# Patient Record
Sex: FEMALE | Race: WHITE | Employment: FULL TIME | URBAN - METROPOLITAN AREA
[De-identification: names, ages, dates, MRNs, and addresses within clinical notes are randomized per-mention and may not be internally consistent; named-entity substitution may affect disease eponyms.]

---

## 2017-12-04 PROCEDURE — 87591 N.GONORRHOEAE DNA AMP PROB: CPT | Performed by: NURSE PRACTITIONER

## 2017-12-04 PROCEDURE — 88175 CYTOPATH C/V AUTO FLUID REDO: CPT | Performed by: NURSE PRACTITIONER

## 2017-12-04 PROCEDURE — 87491 CHLMYD TRACH DNA AMP PROBE: CPT | Performed by: NURSE PRACTITIONER

## 2018-12-11 PROCEDURE — 88175 CYTOPATH C/V AUTO FLUID REDO: CPT | Performed by: NURSE PRACTITIONER

## 2024-09-10 ENCOUNTER — NURSE ONLY (OUTPATIENT)
Dept: INTERNAL MEDICINE CLINIC | Facility: HOSPITAL | Age: 29
End: 2024-09-10
Attending: PREVENTIVE MEDICINE

## 2024-09-10 DIAGNOSIS — Z00.00 WELLNESS EXAMINATION: Primary | ICD-10-CM

## 2024-09-10 PROCEDURE — 86480 TB TEST CELL IMMUN MEASURE: CPT

## 2024-09-12 LAB
M TB IFN-G CD4+ T-CELLS BLD-ACNC: 0 IU/ML
M TB TUBERC IFN-G BLD QL: NEGATIVE
M TB TUBERC IGNF/MITOGEN IGNF CONTROL: >10 IU/ML
QFT TB1 AG MINUS NIL: 0 IU/ML
QFT TB2 AG MINUS NIL: 0 IU/ML

## 2025-03-17 ENCOUNTER — OFFICE VISIT (OUTPATIENT)
Dept: FAMILY MEDICINE CLINIC | Facility: CLINIC | Age: 30
End: 2025-03-17

## 2025-03-17 VITALS
DIASTOLIC BLOOD PRESSURE: 72 MMHG | SYSTOLIC BLOOD PRESSURE: 110 MMHG | HEIGHT: 62 IN | BODY MASS INDEX: 21.9 KG/M2 | HEART RATE: 72 BPM | WEIGHT: 119 LBS

## 2025-03-17 DIAGNOSIS — E55.9 VITAMIN D DEFICIENCY: ICD-10-CM

## 2025-03-17 DIAGNOSIS — Z00.00 ENCOUNTER FOR ANNUAL HEALTH EXAMINATION: Primary | ICD-10-CM

## 2025-03-17 NOTE — PROGRESS NOTES
HPI:   Amarilys Herrera is a 29 year old female who presents for a complete physical exam.    Lives with sister and her BILL and nephew.   Currently going through a divorce.     EXERCISING reg  Meal prepping.       Menses are regular        Wt Readings from Last 3 Encounters:   25 119 lb (54 kg)   21 125 lb (56.7 kg)   20 122 lb (55.3 kg)     Body mass index is 21.77 kg/m².       Current Outpatient Medications   Medication Sig Dispense Refill    Norethin Ace-Eth Estrad-FE (JUNEL FE 1/20) 1-20 MG-MCG Oral Tab Take 1 tablet by mouth daily. (Patient not taking: Reported on 3/17/2025) 84 tablet 3    Nitrofurantoin Monohyd Macro 100 MG Oral Cap TAKE 1 CAPSULE BY MOUTH AS NEEDED AFTER INTERCOURSE AS DIRECTED (Patient not taking: Reported on 3/17/2025) 90 capsule 1    valACYclovir HCl 500 MG Oral Tab TK 1 T PO BID FOR 5 DAYS (Patient not taking: Reported on 3/17/2025) 10 tablet 3      Past Medical History:    Herpes simplex    oral    OTHER DISEASES    eczema    Recurrent UTI    VARICELLA    shingles      Past Surgical History:   Procedure Laterality Date    [place] teeth removed  2017      Family History   Problem Relation Age of Onset    Hypertension Father     Other (Dyslipidemia) Father     Hypertension Mother     Cancer Mother         thyroid cancer    Asthma Mother     Other (Myocardial infaction) Maternal Grandfather 46        massive heart attack       Social History:   Social History     Socioeconomic History    Marital status:    Tobacco Use    Smoking status: Never     Passive exposure: Never    Smokeless tobacco: Never   Vaping Use    Vaping status: Never Used   Substance and Sexual Activity    Alcohol use: Yes     Comment: 5 drinks weekly     Drug use: Not Currently     Types: Cannabis    Sexual activity: Yes     Partners: Male     Birth control/protection: Condom     Comment: 2014     Social Drivers of Health     Food Insecurity: No Food Insecurity (2022)    Received from  [facility]    Hunger Vital Sign     Worried About Running Out of Food in the Last Year: Never true     Ran Out of Food in the Last Year: Never true   Transportation Needs: No Transportation Needs (2/7/2022)    Received from [facility]    PRAPARE - Transportation     Lack of Transportation (Medical): No     Lack of Transportation (Non-Medical): No   Stress: No Stress Concern Present (2/7/2022)    Received from [facility]    [employer] - Occupational Stress Questionnaire     Feeling of Stress : Not at all   Housing Stability: Low Risk  (2/7/2022)    Received from [facility]    Housing Stability Vital Sign     Unable to Pay for Housing in the Last Year: No     Number of Places Lived in the Last Year: 1     Unstable Housing in the Last Year: No          REVIEW OF SYSTEMS:   Negative, except per HPI.     EXAM:   /72 (BP Location: Left arm, Patient Position: Sitting, Cuff Size: adult)   Pulse 72   Ht 5' 2\" (1.575 m)   Wt 119 lb (54 kg)   LMP 02/19/2025 (Exact Date)   BMI 21.77 kg/m²     GENERAL: well developed, well nourished, in no apparent distress  SKIN: no rashes, no suspicious lesions  HEENT: atraumatic, normocephalic, ears are clear  EYES: PERRLA, EOMI,conjunctiva are clear  NECK: supple, no adenopathy  LUNGS: clear to auscultation  CARDIO: RRR without murmur  GI: good BS, no masses or tenderness  MUSCULOSKELETAL: back is not tender, FROM of the back  EXTREMITIES: no cyanosis or edema  NEURO: Oriented times three, cranial nerves are intact, motor and sensory are grossly intact    ASSESSMENT AND PLAN:   Amarilys Herrera is a 29 year old female who presents for a complete physical exam.    1. Encounter for annual health examination  -Medical, surgical and social history, as well as medications and allergies were reviewed with patient.  Has appt with obg tomorrow  - CBC With Differential With  Platelet; Future  - Comp Metabolic Panel (14); Future  - Hemoglobin A1C; Future  - Lipid Panel; Future  - TSH W Reflex To Free T4; Future    2. Vitamin D deficiency    - Vitamin D [E]; Future         Shawanda Hinton MD  3/17/2025  11:27 AM

## 2025-03-18 ENCOUNTER — OFFICE VISIT (OUTPATIENT)
Dept: OBGYN CLINIC | Facility: CLINIC | Age: 30
End: 2025-03-18
Payer: COMMERCIAL

## 2025-03-18 ENCOUNTER — LAB ENCOUNTER (OUTPATIENT)
Dept: LAB | Age: 30
End: 2025-03-18
Attending: STUDENT IN AN ORGANIZED HEALTH CARE EDUCATION/TRAINING PROGRAM
Payer: COMMERCIAL

## 2025-03-18 VITALS — WEIGHT: 117 LBS | SYSTOLIC BLOOD PRESSURE: 123 MMHG | DIASTOLIC BLOOD PRESSURE: 79 MMHG | BODY MASS INDEX: 21 KG/M2

## 2025-03-18 DIAGNOSIS — N89.8 VAGINAL DISCHARGE: ICD-10-CM

## 2025-03-18 DIAGNOSIS — Z11.3 SCREENING EXAMINATION FOR STD (SEXUALLY TRANSMITTED DISEASE): ICD-10-CM

## 2025-03-18 DIAGNOSIS — Z00.00 ENCOUNTER FOR ANNUAL HEALTH EXAMINATION: ICD-10-CM

## 2025-03-18 DIAGNOSIS — Z12.4 SCREENING FOR CERVICAL CANCER: Primary | ICD-10-CM

## 2025-03-18 DIAGNOSIS — E55.9 VITAMIN D DEFICIENCY: ICD-10-CM

## 2025-03-18 LAB
ALBUMIN SERPL-MCNC: 4.5 G/DL (ref 3.2–4.8)
ALBUMIN/GLOB SERPL: 1.5 {RATIO} (ref 1–2)
ALP LIVER SERPL-CCNC: 31 U/L
ALT SERPL-CCNC: 18 U/L
ANION GAP SERPL CALC-SCNC: 8 MMOL/L (ref 0–18)
AST SERPL-CCNC: 18 U/L (ref ?–34)
BASOPHILS # BLD AUTO: 0.05 X10(3) UL (ref 0–0.2)
BASOPHILS NFR BLD AUTO: 0.8 %
BILIRUB SERPL-MCNC: 0.8 MG/DL (ref 0.3–1.2)
BUN BLD-MCNC: 13 MG/DL (ref 9–23)
BUN/CREAT SERPL: 13.4 (ref 10–20)
CALCIUM BLD-MCNC: 9.4 MG/DL (ref 8.7–10.4)
CHLORIDE SERPL-SCNC: 106 MMOL/L (ref 98–112)
CHOLEST SERPL-MCNC: 166 MG/DL (ref ?–200)
CO2 SERPL-SCNC: 27 MMOL/L (ref 21–32)
CREAT BLD-MCNC: 0.97 MG/DL
DEPRECATED RDW RBC AUTO: 43.2 FL (ref 35.1–46.3)
EGFRCR SERPLBLD CKD-EPI 2021: 80 ML/MIN/1.73M2 (ref 60–?)
EOSINOPHIL # BLD AUTO: 0.17 X10(3) UL (ref 0–0.7)
EOSINOPHIL NFR BLD AUTO: 2.9 %
ERYTHROCYTE [DISTWIDTH] IN BLOOD BY AUTOMATED COUNT: 12.9 % (ref 11–15)
EST. AVERAGE GLUCOSE BLD GHB EST-MCNC: 100 MG/DL (ref 68–126)
FASTING PATIENT LIPID ANSWER: YES
FASTING STATUS PATIENT QL REPORTED: YES
GLOBULIN PLAS-MCNC: 3 G/DL (ref 2–3.5)
GLUCOSE BLD-MCNC: 84 MG/DL (ref 70–99)
HBA1C MFR BLD: 5.1 % (ref ?–5.7)
HBV SURFACE AG SER-ACNC: <0.1 [IU]/L
HBV SURFACE AG SERPL QL IA: NONREACTIVE
HCT VFR BLD AUTO: 40.5 %
HCV AB SERPL QL IA: NONREACTIVE
HDLC SERPL-MCNC: 58 MG/DL (ref 40–59)
HGB BLD-MCNC: 13.2 G/DL
IMM GRANULOCYTES # BLD AUTO: 0.02 X10(3) UL (ref 0–1)
IMM GRANULOCYTES NFR BLD: 0.3 %
LDLC SERPL CALC-MCNC: 96 MG/DL (ref ?–100)
LYMPHOCYTES # BLD AUTO: 1.94 X10(3) UL (ref 1–4)
LYMPHOCYTES NFR BLD AUTO: 32.8 %
MCH RBC QN AUTO: 29.6 PG (ref 26–34)
MCHC RBC AUTO-ENTMCNC: 32.6 G/DL (ref 31–37)
MCV RBC AUTO: 90.8 FL
MONOCYTES # BLD AUTO: 0.44 X10(3) UL (ref 0.1–1)
MONOCYTES NFR BLD AUTO: 7.4 %
NEUTROPHILS # BLD AUTO: 3.3 X10 (3) UL (ref 1.5–7.7)
NEUTROPHILS # BLD AUTO: 3.3 X10(3) UL (ref 1.5–7.7)
NEUTROPHILS NFR BLD AUTO: 55.8 %
NONHDLC SERPL-MCNC: 108 MG/DL (ref ?–130)
OSMOLALITY SERPL CALC.SUM OF ELEC: 291 MOSM/KG (ref 275–295)
PLATELET # BLD AUTO: 231 10(3)UL (ref 150–450)
POTASSIUM SERPL-SCNC: 4.2 MMOL/L (ref 3.5–5.1)
PROT SERPL-MCNC: 7.5 G/DL (ref 5.7–8.2)
RBC # BLD AUTO: 4.46 X10(6)UL
SODIUM SERPL-SCNC: 141 MMOL/L (ref 136–145)
T PALLIDUM AB SER QL IA: NONREACTIVE
TRIGL SERPL-MCNC: 63 MG/DL (ref 30–149)
TSI SER-ACNC: 0.69 UIU/ML (ref 0.55–4.78)
VIT D+METAB SERPL-MCNC: 24 NG/ML (ref 30–100)
VLDLC SERPL CALC-MCNC: 10 MG/DL (ref 0–30)
WBC # BLD AUTO: 5.9 X10(3) UL (ref 4–11)

## 2025-03-18 PROCEDURE — 80053 COMPREHEN METABOLIC PANEL: CPT

## 2025-03-18 PROCEDURE — 86803 HEPATITIS C AB TEST: CPT

## 2025-03-18 PROCEDURE — 87389 HIV-1 AG W/HIV-1&-2 AB AG IA: CPT

## 2025-03-18 PROCEDURE — 83036 HEMOGLOBIN GLYCOSYLATED A1C: CPT

## 2025-03-18 PROCEDURE — 86780 TREPONEMA PALLIDUM: CPT

## 2025-03-18 PROCEDURE — 36415 COLL VENOUS BLD VENIPUNCTURE: CPT

## 2025-03-18 PROCEDURE — 99385 PREV VISIT NEW AGE 18-39: CPT | Performed by: STUDENT IN AN ORGANIZED HEALTH CARE EDUCATION/TRAINING PROGRAM

## 2025-03-18 PROCEDURE — 87340 HEPATITIS B SURFACE AG IA: CPT

## 2025-03-18 PROCEDURE — 85025 COMPLETE CBC W/AUTO DIFF WBC: CPT

## 2025-03-18 PROCEDURE — 84443 ASSAY THYROID STIM HORMONE: CPT

## 2025-03-18 PROCEDURE — 82306 VITAMIN D 25 HYDROXY: CPT

## 2025-03-18 PROCEDURE — 80061 LIPID PANEL: CPT

## 2025-03-18 NOTE — PROGRESS NOTES
[facility]  Obstetrics and Gynecology  Annual  bAy Harris PA-C    Chief Complaint   Patient presents with    New Patient    Gynecologic Exam     Pap. Reviewed Preventative/Wellness form with patient.        Amarilys Herrera is a 29 year old female  is a new patient to me presenting for her annual well woman exam. Patient's last menstrual period was 2025 (exact date). Reports regular cycles.  Not currently sexually active, would like STD screening. No history of abnormal pap smears. Has noted more green tinged discharge for the last several days. No odor, irritation, or itching.     Pap:   Contraception:None  Gardasil: completed    OBSTETRICS HISTORY:     OB History    Para Term  AB Living   0 0 0 0 0 0   SAB IAB Ectopic Multiple Live Births   0 0 0 0         GYNE HISTORY:     Menarche: [age]y/o (3/18/2025  7:28 AM)  Period Cycle (Days): 21-28days (3/18/2025  7:28 AM)  Period Duration (Days): 5-7days (3/18/2025  7:28 AM)  Period Flow: Heavy to light (3/18/2025  7:28 AM)  Use of Birth Control (if yes, specify type): None (3/18/2025  7:28 AM)  Hx Prior Abnormal Pap: No (3/18/2025  7:28 AM)      History   Sexual Activity    Sexual activity: Yes    Partners: Male    Birth control/ protection: Condom     Comment: 2014           Latest Ref Rng & Units 2018     7:41 AM 2017    10:06 AM 2016     4:46 PM   RECENT PAP RESULTS   INTERPRETATION/RESULT: Negative for intraepithelial lesion or malignancy, Negative for intraepithelial lesion or malignancy-Reactive/Other changes, Unsatisfactory for Evaluation Negative for intraepithelial lesion or malignancy  Negative for intraepithelial lesion or malignancy  Negative for intraepithelial lesion or malignancy          MEDICAL HISTORY:     Past Medical History:   Diagnosis Date    Herpes simplex     oral    OTHER DISEASES     eczema    Recurrent UTI 2015    VARICELLA 2013    shingles      Past Surgical History:   Procedure  Laterality Date    [place] teeth removed  2017       SOCIAL HISTORY:     Tobacco Use: Low Risk  (3/18/2025)    Patient History     Smoking Tobacco Use: Never     Smokeless Tobacco Use: Never     Passive Exposure: Never       Depression Screening:   Depression Screening (PHQ-2/PHQ-9): Over the LAST 2 WEEKS   Little interest or pleasure in doing things (over the last two weeks)?: Not at all    Feeling down, depressed, or hopeless (over the last two weeks)?: Not at all    PHQ-2 SCORE: 0          FAMILY HISTORY:     Family History   Problem Relation Age of Onset    Hypertension Father     Other (Dyslipidemia) Father     Hypertension Mother     Cancer Mother         thyroid cancer    Asthma Mother     Other (Myocardial infaction) Maternal Grandfather 46        massive heart attack        MEDICATIONS:       Current Outpatient Medications:     Norethin Ace-Eth Estrad-FE (JUNEL FE 1/20) 1-20 MG-MCG Oral Tab, Take 1 tablet by mouth daily. (Patient not taking: Reported on 3/17/2025), Disp: 84 tablet, Rfl: 3    Nitrofurantoin Monohyd Macro 100 MG Oral Cap, TAKE 1 CAPSULE BY MOUTH AS NEEDED AFTER INTERCOURSE AS DIRECTED (Patient not taking: Reported on 3/17/2025), Disp: 90 capsule, Rfl: 1    valACYclovir HCl 500 MG Oral Tab, TK 1 T PO BID FOR 5 DAYS (Patient not taking: Reported on 3/17/2025), Disp: 10 tablet, Rfl: 3    ALLERGIES:     Allergies[1]    REVIEW OF SYSTEMS:     Review of Systems   Constitutional:  Negative for chills, fever and unexpected weight change.   Respiratory: Negative.     Cardiovascular: Negative.    Gastrointestinal:  Negative for abdominal pain, constipation, diarrhea and nausea.   Genitourinary:  Positive for vaginal discharge. Negative for dyspareunia, dysuria, genital sores, hematuria, menstrual problem, pelvic pain, vaginal bleeding and vaginal pain.   Musculoskeletal: Negative.    Skin: Negative.    Neurological: Negative.    Hematological: Negative.    Psychiatric/Behavioral: Negative.            PHYSICAL EXAM:     Vitals:    03/18/25 0726   BP: 123/79   Weight: 117 lb (53.1 kg)       Body mass index is 21.4 kg/m².     Constitutional: well developed, well nourished  Psychiatric:  Oriented to time, place, person and situation. Appropriate mood and affect  Head/Face: normocephalic  Neck/Thyroid: thyroid symmetric, no thyromegaly, no nodules, no adenopathy  Lymphatic:no abnormal supraclavicular or axillary adenopathy is noted  Breast: normal without palpable masses, tenderness, asymmetry, nipple discharge, nipple retraction or skin changes  Abdomen:  soft, nontender, nondistended, no masses  Skin/Hair: no unusual rashes or bruises  Extremities: no edema, no cyanosis    Pelvic Exam:  External Genitalia: normal appearance, hair distribution, and no lesions  Urethral Meatus:  normal in size, location, without lesions and prolapse  Bladder:  No fullness, masses or tenderness  Vagina:  Normal appearance without lesions, no abnormal discharge  Cervix:  Normal without tenderness on motion  Uterus: normal in size, contour, position, mobility, without tenderness  Adnexa: normal without masses or tenderness  Perineum: normal  Anus: no hemorroids       ASSESSMENT:     Amarilys was seen today for new patient and gynecologic exam.    Diagnoses and all orders for this visit:    Screening for cervical cancer  -     ThinPrep PAP Smear; Future  -     Hpv Dna  High Risk , Thin Prep Collect; Future    Screening examination for STD (sexually transmitted disease)  -     Chlamydia/GC PCR Combo; Future  -     HIV AG AB Combo; Future  -     Hepatitis B Surface Antigen; Future  -     HCV Antibody; Future  -     TREP; Future    Vaginal discharge  -     Vaginitis Vaginosis PCR Panel; Future        PLAN:   Normal exam.  Pap smear and GC/Chlamydia cervical cultures done.   Recommend repeat pap smear with co-testing every 3 years for normal/ -HPV.  Contraceptive counseling completed.  Screening mammogram recommended starting at 40  unless significant family history or concerning symptoms.  Maintain healthy lifestyle with well-balance diet and daily exercise.   Return to clinic in one year or as needed.    Vaginal cultures collected, will treat pending results.       SUMMARY:  Pap: Next cotest 3-5 years per ASCCP guidelines.  BCM:  None  STD screening: GC/Chl/Trich/HepB/HepC/HIV/RPR, condoms encouraged  Mammogram: n/a -- once 38 yrs old  HM updated    FOLLOW-UP     No follow-ups on file.    LEN HERNÁNDEZ PA-C  7:43 AM  3/18/2025    Note to patient and family:  The 21st Century Cures Act makes medical notes available to patients in the interest of transparency.  However, please be advised that this is a medical document.  It is intended as a peer to peer communication.  It is written in medical language and may contain abbreviations or verbiage that are technical and unfamiliar.  It may appear blunt or direct.  Medical documents are intended to carry relevant information, facts as evident, and the clinical opinion of the practitioner.       [1] No Known Allergies

## 2025-03-19 LAB
BV BACTERIA DNA VAG QL NAA+PROBE: NEGATIVE
C GLABRATA DNA VAG QL NAA+PROBE: NEGATIVE
C KRUSEI DNA VAG QL NAA+PROBE: NEGATIVE
C TRACH DNA SPEC QL NAA+PROBE: NEGATIVE
CANDIDA DNA VAG QL NAA+PROBE: NEGATIVE
N GONORRHOEA DNA SPEC QL NAA+PROBE: NEGATIVE
T VAGINALIS DNA VAG QL NAA+PROBE: NEGATIVE

## 2025-04-22 ENCOUNTER — OFFICE VISIT (OUTPATIENT)
Dept: OBGYN CLINIC | Facility: CLINIC | Age: 30
End: 2025-04-22
Payer: COMMERCIAL

## 2025-04-22 VITALS — WEIGHT: 122 LBS | BODY MASS INDEX: 22 KG/M2 | DIASTOLIC BLOOD PRESSURE: 74 MMHG | SYSTOLIC BLOOD PRESSURE: 133 MMHG

## 2025-04-22 DIAGNOSIS — N94.10 DYSPAREUNIA IN FEMALE: Primary | ICD-10-CM

## 2025-04-22 DIAGNOSIS — R10.2 PELVIC PAIN: ICD-10-CM

## 2025-04-22 PROCEDURE — 99213 OFFICE O/P EST LOW 20 MIN: CPT | Performed by: STUDENT IN AN ORGANIZED HEALTH CARE EDUCATION/TRAINING PROGRAM

## 2025-04-22 RX ORDER — NORETHINDRONE ACETATE AND ETHINYL ESTRADIOL 1MG-20(21)
1 KIT ORAL DAILY
Qty: 84 TABLET | Refills: 4 | Status: SHIPPED | OUTPATIENT
Start: 2025-04-22 | End: 2026-06-16

## 2025-04-22 NOTE — PROGRESS NOTES
[facility]  Obstetrics and Gynecology  Gyne Problem Visit      The following individual(s) verbally consented to be recorded using ambient AI listening technology and understand that they can each withdraw their consent to this listening technology at any point by asking the clinician to turn off or pause the recording:    Patient name: Amarilys Herrera  History of Present Illness  The patient, with a past medical history of chronic pelvic pain and tension, presents today with a request for a birth control prescription. She reports a preference for oral contraceptives, having previously been on Junel with minimal side effects. The patient also reports experiencing painful intercourse and vaginal dryness. The pain is described as deep, almost like cervix pain, and occurs during and after intercourse. The patient has tried using lubrication but reports persistent vaginal dryness. She also mentions a history of urinary tract infections (UTIs) post-sexual activity, but does not believe the current symptoms are related to a UTI. The patient has a family history of pelvic floor dysfunction and lichen sclerosis, and suspects she may have a similar condition. She expresses interest in pursuing pelvic floor therapy.    She recently had a negative vaginosis and STD screening. Denies any fevers. No abnormal vaginal discharge or odor.    Patient's last menstrual period was 2025 (exact date).     Pap: 3/2025  Contraception:None    OBSTETRICS HISTORY:  OB History    Para Term  AB Living   0 0 0 0 0 0   SAB IAB Ectopic Multiple Live Births   0 0 0 0 0       GYNE HISTORY:      Menarche: 11-13y/o (3/18/2025  7:28 AM)  Period Cycle (Days): 21-28days (3/18/2025  7:28 AM)  Period Duration (Days): 5-7days (3/18/2025  7:28 AM)  Period Flow: Heavy to light (3/18/2025  7:28 AM)  Use of Birth Control (if yes, specify type): None (3/18/2025  7:28 AM)  Hx Prior Abnormal Pap: No (3/18/2025  7:28 AM)        Latest Ref Rng & Units  3/18/2025     8:13 AM 12/11/2018     7:41 AM 12/4/2017    10:06 AM 11/16/2016     4:46 PM   RECENT PAP RESULTS   INTERPRETATION/RESULT: Negative for intraepithelial lesion or malignancy Negative for intraepithelial lesion or malignancy  Negative for intraepithelial lesion or malignancy  Negative for intraepithelial lesion or malignancy  Negative for intraepithelial lesion or malignancy          History   Sexual Activity    Sexual activity: Yes    Partners: Male    Birth control/ protection: Condom     Comment: 12/2/2014       MEDICAL HISTORY:  Past Medical History[1]  Past Surgical History[2]      MEDICATIONS:  Medications - Current[3]    ALLERGIES:  Allergies[4]      REVIEW OF SYSTEMS:  Review of Systems   Constitutional:  Negative for chills, fever and unexpected weight change.   Respiratory: Negative.     Cardiovascular: Negative.    Gastrointestinal:  Negative for abdominal pain, constipation, diarrhea and nausea.   Genitourinary:  Positive for pelvic pain. Negative for dyspareunia, dysuria, genital sores, hematuria, menstrual problem, vaginal bleeding, vaginal discharge and vaginal pain.   Musculoskeletal: Negative.    Skin: Negative.    Neurological: Negative.    Hematological: Negative.    Psychiatric/Behavioral: Negative.         PHYSICAL EXAM:  /74   Wt 122 lb (55.3 kg)   LMP 04/09/2025 (Exact Date)   BMI 22.31 kg/m²     Chaperone offered, pt declined.     GENERAL: well developed, well nourished, in no apparent distress  ABDOMEN: Soft, non distended; non tender, no masses  GYNE/: External Genitalia: Normal appearing, no lesions. Urethral meatus appear wnl, no abnormal discharge or lesions noted.          Bladder: well supported, urethra wnl, no lesions or fissures                     Vagina: normal pink mucosa, no lesions, normal clear discharge.                      Uterus: Mild tenderness to palpation, mobile, normal size                     Cervix: Normal                      Adnexa: non  tender, no masses, normal size       Assesment & Plan:  Assessment & Plan    Initial prescription OCP  Pt started on Junel Fe 1/20. Counseled on how to start medication, what to do if she misses a day, and potential side effects. Advised pt that OCP takes about 30 days to become effective, thus encouraged condom use. Discussed how OCPs do not prevent HPV or other STIs. Pt verbalized understanding.     Pelvic pain  Chronic deep pelvic pain possibly due to pelvic floor dysfunction or endometriosis. Negative ultrasound for endometriosis. Pain may be related to pelvic floor tension.  - Refer to pelvic floor therapy for assessment and exercises.  - Consider exploratory laparotomy if pelvic floor therapy and birth control do not alleviate symptoms.  - Reassess in 3 months for progress and consider further imaging or surgical exploration if symptoms persist.    Vaginal dryness  - Continue use of water-based lubricants and external moisturizers before intercourse.        ORDERS:   No orders of the defined types were placed in this encounter.    PRESCRIPTIONS:     Requested Prescriptions     Signed Prescriptions Disp Refills    Norethin Ace-Eth Estrad-FE (JUNEL FE 1/20) 1-20 MG-MCG Oral Tab 84 tablet 4     Sig: Take 1 tablet by mouth daily.     IMAGING/ REFERRALS:    PELVIC FLOOR THERAPY - INTERNAL   FOLLOW-UP     Return in about 3 months (around 7/22/2025) for follow-up.    LEN HERNÁNDEZ PA-C  1:09 PM  4/22/2025        Spent total time 20 minutes on obtaining history / chart review, evaluating patient / performing medically appropriate exam, discussing treatment options, counseling / educating, and completing documentation, coordinating care.         [1]   Past Medical History:  Diagnosis Date    Herpes simplex     oral    OTHER DISEASES     eczema    Recurrent UTI 11/2015    VARICELLA 2013    shingles   [2]   Past Surgical History:  Procedure Laterality Date    [place] teeth removed  2017   [3]   Current Outpatient  Medications:     Vitamin D-Vitamin K (VITAMIN K2-VITAMIN D3 OR), , Disp: , Rfl:     Norethin Ace-Eth Estrad-FE (JUNEL FE 1/20) 1-20 MG-MCG Oral Tab, Take 1 tablet by mouth daily., Disp: 84 tablet, Rfl: 4  [4] No Known Allergies

## 2025-06-03 ENCOUNTER — PATIENT MESSAGE (OUTPATIENT)
Dept: PHYSICAL THERAPY | Age: 30
End: 2025-06-03

## 2025-06-04 ENCOUNTER — OFFICE VISIT (OUTPATIENT)
Dept: PHYSICAL THERAPY | Age: 30
End: 2025-06-04
Attending: STUDENT IN AN ORGANIZED HEALTH CARE EDUCATION/TRAINING PROGRAM
Payer: COMMERCIAL

## 2025-06-04 DIAGNOSIS — R10.2 PELVIC PAIN: ICD-10-CM

## 2025-06-04 DIAGNOSIS — N94.10 DYSPAREUNIA IN FEMALE: Primary | ICD-10-CM

## 2025-06-04 PROCEDURE — 97162 PT EVAL MOD COMPLEX 30 MIN: CPT

## 2025-06-04 PROCEDURE — 97112 NEUROMUSCULAR REEDUCATION: CPT

## 2025-06-04 PROCEDURE — 97140 MANUAL THERAPY 1/> REGIONS: CPT

## 2025-06-04 NOTE — PROGRESS NOTES
MUSCULOSKELETAL AND PELVIC FLOOR EVALUATION:     Diagnosis:   Dyspareunia in female (N94.10)  Pelvic pain (R10.2) Patient:  Amarilys Herrera (30 year old, female)        Referring Provider: Aby Ferguson*  Today's Date   2025    Precautions:  None   Date of Evaluation: 25  Next MD visit: No data recorded  Date of Surgery: No data recorded     PATIENT SUMMARY     pt states that she has pain with and after sexual intercourse cyclical cramps and feeling fullness with menses, not able to enjoy sexual intercourse. also reports issues  issues with incontince worst with exercises, deep pain on the cervix is noted  History of current condition: her conditin has been for years, no surgical causes. She has issues with constipation and  heavy bleeding, no pelvic PT in the past   Pain level: current 2 /10, at best 0 /10, at worst 8 /10 (with sexual intercourse)  Description of symptoms: deep pelvic pain, nerve pain randomly happens, pain with sexual intercourse and after the activity,incontinence   Occupation: nurse   Leisure activities/Hobbies: work out x 4 days a week, running, core power yoga   Prior level of function: pt states that  sthat she has been having issues for 8 years, getting better  Current limitations: painful with sexual intercourse  Pt goals: to be able to have decreased pain and incontinence  Pregnant Now:     OB History    Para Term  AB Living   0 0 0 0 0 0   SAB IAB Ectopic Multiple Live Births   0 0 0 0 0     Do you usually experience pressure in the lower abdomen?ModeratelyDo you usually experience heaviness or dullness in the pelvic area?SomewhatDo you usually have a bulge or something falling out that you can see or feel in your vaginal area?Not presentDo you ever have to push on the vagina or around the rectum to have or complete a bowel movement?SomewhatDo you usually experience a feeling of incomplete bladder emptying?ModeratelyDo you ever have to push up on a  bulge in the vaginal area with your fingers to start or complete urination?Not presentDo you feel you need to strain too hard to have a bowel movement?SomewhatDo you feel you have not completely emptied your bowels at the end of a bowel movement?ModeratelyDo you usually lose stool beyond your control if your stool is well formed?Not presentDo you usually lose stool beyond your control if your stool is loose?Not presentDo you usually lose gas from the rectum beyond your control?SomewhatDo you usually have pain when you pass your stool?ModeratelyDo you experience strong sense of urgency and have to rush to bathroom for bowel movement?Not presentDoes part of bowel ever pass through rectum and bulge outside during/after bowel movement?Not presentDo you usually experience frequent urination?SomewhatDo you usually experience urine leakage associated with a feeling of urgency, that is, a strong sensation of needing to go to the bathroom?SomewhatDo you usually experience urine leakage related to coughing, sneezing, or laughing?Not presentDo you usually experience small amounts of urine leakage (that is, drops)?Not presentDo you usually experience difficulty empyting your bladder?Not presentDo you usually experience pain or discomfort in the lower abdomen or genital region?SomewhatPelvic Organ Prolapse Distress Inventory 6 Score (range: 0 - 100)41.67Colorectal-Anal Distress Inventory 8 Score (range: 0 - 100)31.25Urinary Distress Inventory 6 Score (range: 0 - 100)25PFDI Summary Score (range: 0 - 300)97.92      Sexual Health Status  Marinoff Scale: 2    History of sexual abuse:  none   Sexual intercourse status:   active    Past medical history was reviewed with Amarilys.  Significant findings include:    Imaging/Tests:     Amarilys  has a past medical history of Herpes simplex, OTHER DISEASES, Recurrent UTI (11/2015), and VARICELLA (2013).  She  has a past surgical history that includes wisdom teeth removed  (2017).    ASSESSMENT  Amarilys presents to physical therapy evaluation with primary c/o pt states that she has pain with and after sexual intercourse cyclical cramps and feeling fullness with menses, not able to enjoy sexual intercourse. also reports issues  issues with incontince worst with exercises, deep pain on the cervix is noted. The results of the objective tests and measures show The results of the objective tests and measures show joint mobility, motor function, muscle performance, muscle endurance, range of motion, and coordination  involving the PFM  with  noted decreased strength, endurance and coordination. increased tone  noted on PF at this time as noted below, areas specified   The results of the objective tests and measures show the following lumbopelvic hip area . Noted  WFL lumbar mobility,  WFL hip strength.    . Functional deficits include but are not limited to painful with sexual intercourse. Signs and symptoms are consistent with diagnosis of Dyspareunia in female (N94.10)  Pelvic pain (R10.2). Pt and PT discussed evaluation findings, pathology, POC and HEP.  Pt voiced understanding and performs HEP correctly without reported pain. Skilled Physical Therapy is medically necessary to address the above impairments and reach functional goals.    OBJECTIVE:      Obstetrical/Gynecological history:    SURGERIES: none    Pregnant Now: No  Method of contraceptions : pill   Last menstrual period:month ago   Painful periods :Yes   Heavy periods :Yes     0/ Para 0      Vaginal dryness : Yes:   Abdominal/Vaginal Pressure complaints:Yes  with cycles   Organ falling out:No       URINARY HABITS    Stress Urinary Incontinence : Yes  Events associated with the onset of urinary complaints: jumping and exercises  Amount of leakage: better, occasionally,  small leaks  Do you ever leak urine without knowing it?  No   No activity:    No    Urge Urinary Incontinence : No  Amount of leakage: NA   Triggers :  No      Urinary Frequency:    How often ;  decreased frequency  :every 6 hrs  Urine Stream: needs to concentrate to not push   Amount: more than normal    Painful urination : after sex  Trouble emptying bladder: completely :Yes   Post void dribble: Yes   Urine Stop test: Yes   Hovering: No   Nocturia: No   Empty bladder just in case: No     ADDITIONAL INFO:    Pad use:    underwear   Change frequency:  2x  Fluid Intake: 96 oz  Bladder irritants: electrolytes and creatine, coffee      BOWEL HABITS    Types of symptoms: Constipation   Frequency of bowel movements: 1-3 a day  Stool consistency: [place] Stool Scale: 1 or 3  Do you strain with defecation: yes  Laxative/Stool softener use: No    SEXUAL HEALTH STATUS    History of Sexual Abuse: No   Sexual [place] Status: Yes   Pain with initial and/or deep or pain after sexual activity penetration: Yes both  deep/initial,   Pain lasting pain after sexual activity :Yes  Orgasm status: able  to No   Pain with pelvic exam: Yes     The Marinoff Dyspareunia Scale (MDS) is a four-point scale that measures the pain limitations that may prevent someone from having sexual intercourse:   0: No limitations   1: Discomfort, but doesn't prevent intercourse   2: Frequently prevents intercourse   3: Completely prevents intercourse      Types of symptoms: stress incontinence and incomplete emptying ;abdominal pain, urgency/frequency, incomplete emptying, constipation, and post void dribble; decreased frequency,concentrate or push at times urine flow, dyspareunia         Musculoskeletal  Posture: Posture: forward flexed posture   External Palpation: pain on pelvic clock 7,8,9          Informed consent for internal pelvic evaluation given:Yes     External Observation:   Voluntary contraction: present   Voluntary relaxation: present (decreased)   Involuntary contraction:     Involuntary relaxation:     Mons pubis: WNL   Labia majora: WNL  Labia minora: WNL   Urethral meatus:  WNL   Introitus: WNL   Perineal body: WNL  Anus/External Anal Sphincter: NT    Internal Examination   Scar:      Pelvic Floor Muscle strength: (PERF= Power/Endurance/Reps/Fast) MMT:  Power Endurance REPS Fast   2+ 8 1 4     Accessory Muscle Use: abdominals     Tissue Laxity Test:  Anterior Wall: WNL  Posterior Wall: WNL   Apical: NT     Bearing down Valsalva Maneuver (2-3x): decreased     Internal Palpation:  right side worse than L  Superficial Transverse Perineal  moderate restriction   Bulbospongiosus moderate restriction   Ischiocavernosus moderate restriction   Deep Transverse Perineal moderate restriction   Compress Urethra/Sphincter moderate restriction   Urethrovaginalis not tested   Pubococcygeus moderate restriction   Iliococcygeus moderate restriction   Coccygeus not tested   Piriformis (palpated externally)  tenderness   Obturator Internus  moderate restriction   Pelvic Clock       ROM and Strength  (* denotes performed with pain)  Trunk and LE motions WFL,painfree  in all planes  Moderate tightness with hamstrings    B hip strength 5/5  in all major muscles with good eccentric strength per BLE and single leq squats        Cough Reflex: absent     Balance and Functional Mobility  Gait: pt ambulates on level ground with normal mechanics.       Today's Treatment and Response:   Pt education was provided on exam findings, treatment diagnosis, treatment plan, expectations, and prognosis.  Today's Treatment       6/4/2025   Pelvic Treatment   Neuro Re-Education Diaphragmatic breathing x5 reps seated and internal    Diaphragmatic breathing with PFM coordination x 5:seated internal  Precontraction internal with cough/bridges  Tra facilitation x2   Manual Therapy Informed consent for internal pelvic evaluation given: Yes      PFM :   MFR/internal   Layers 1-3 focused tender areas   Therapeutic Activity PT provided/reviewed education on pelvic anatomy and function with diagrams and pelvic model, bladder  normatives, adequate hydration levels, proper toileting posture, diaphragmatic breathing for PNS activation and pelvic floor relaxation , and knack/pelvic muscle brace   umbrella breathing  proper posturing  precontractions toileting posture     Neuro Re-Educ Minutes 15   Manual Therapy Minutes 10   Evaluation Minutes 30   Total Time Of Timed Procedures 25   Total Time Of Service-Based Procedures 30   Total Treatment Time 55        Patient was instructed in and issued a HEP for:      Charges:  PT EVAL: Moderate Complexity, eval,man mob x1, neuro shan x1  In agreement with evaluation findings and clinical rationale, this evaluation involved MODERATE COMPLEXITY decision making due to 1-2 personal factors/comorbidities, 3 or more body structures involved/activity limitations, and evolving symptoms as documented in the evaluation.                                                                       PLAN OF CARE:    Goals: (to be met in 10 visits)    Not Met Progress Toward Partially Met Met   Pt will be I with HEP,its progression and management of symptoms, biomechanical strategies and self correction of upright posturing and PFM facilitation to manage IAP with daily tasks [] [] [] []   Patient will verbalize improved  understanding of  bladder/bowel habits ,bladder ,bladder retraining  and long term management [] [] [] []   Patient will exhibit increased isolated pelvic floor strength  , with appropriate relaxation for improve pelvic brace /IAP management with ADLS  manage UI/MESERET and prevent leakage during ADLS and  have less dyspareunia [] [] [] []   Patient to report urinary frequency to every at least 4 hours to decreased risk of retention [] [] [] []   Patient reports a reduction in MESERET 50% or better to  day to maintain skin integrity of vulva area. [] [] [] []   Pt will demonstrate biomechanical strategies for managing IAP for safe performance of  daily, recreational  and work tasks, 75% of the time to avoid  incontinence issues,  [] [] [] []   Pt will demonstrate decreased muscular tone/tenderness on PFM to minimal to  none in order to safely progress with PFM strengthening and stabilization as appropriate and improve Marinoff grade [] [] [] []          Frequency / Duration: Patient will be seen 1-2x/week or a total of 10  visits over a 90 day period. Treatment will include: Manual Therapy; Neuromuscular Re-education; Self-Care Home Management; Therapeutic Activities; Therapeutic Exercise; Home Exercise Program instruction; Patient/Family Education    Education or treatment limitation: None   Rehab Potential: good     Patient/Family/Caregiver was advised of these findings, precautions, and treatment options and has agreed to actively participate in planning and for this course of care.    Thank you for your referral. Please co-sign or sign and return this letter via fax as soon as possible to [phone]. If you have any questions, please contact me at Dept: [phone]    Sincerely,  Electronically signed by therapist: Dulce Quiroga PT  Physician's certification required: Yes  I certify the need for these services furnished under this plan of treatment and while under my care.    X___________________________________________________ Date____________________    Certification From: 6/4/2025  To:9/2/2025

## 2025-06-10 ENCOUNTER — NURSE ONLY (OUTPATIENT)
Dept: INTERNAL MEDICINE CLINIC | Facility: HOSPITAL | Age: 30
End: 2025-06-10
Attending: PREVENTIVE MEDICINE

## 2025-06-10 DIAGNOSIS — Z00.00 WELLNESS EXAMINATION: Primary | ICD-10-CM

## 2025-06-10 PROCEDURE — 86480 TB TEST CELL IMMUN MEASURE: CPT

## 2025-06-11 LAB
M TB IFN-G CD4+ T-CELLS BLD-ACNC: 0.04 IU/ML
M TB TUBERC IFN-G BLD QL: NEGATIVE
M TB TUBERC IGNF/MITOGEN IGNF CONTROL: >10 IU/ML
QFT TB1 AG MINUS NIL: 0.01 IU/ML
QFT TB2 AG MINUS NIL: 0.01 IU/ML

## 2025-06-12 ENCOUNTER — OFFICE VISIT (OUTPATIENT)
Dept: PHYSICAL THERAPY | Age: 30
End: 2025-06-12
Attending: STUDENT IN AN ORGANIZED HEALTH CARE EDUCATION/TRAINING PROGRAM
Payer: COMMERCIAL

## 2025-06-12 PROCEDURE — 97140 MANUAL THERAPY 1/> REGIONS: CPT

## 2025-06-12 PROCEDURE — 97112 NEUROMUSCULAR REEDUCATION: CPT

## 2025-06-12 PROCEDURE — 97110 THERAPEUTIC EXERCISES: CPT

## 2025-06-12 NOTE — PROGRESS NOTES
Patient: Amarilys Herrera (30 year old, female) Referring Provider:  Insurance:   Diagnosis: Dyspareunia in female (N94.10)  Pelvic pain (R10.2) Aby Johansenliboriosophei Ferguson*  PATRICIAJONY   Date of Surgery: No data recorded Next MD visit:  N/A   Precautions:  None No data recorded Referral Information:    Date of Evaluation: Req: 0, Auth: 0, Exp:     06/04/25 POC Auth Visits:  10        Today's Date   6/12/2025    Subjective  Pt states that she was sore. pt states she has been trying to be more aware of urge,still going every 6 hrs at work       Pain: 0/10     Objective          Internal Palpation:  right side worse than L    Superficial Transverse Perineal  moderate restriction   Bulbospongiosus moderate restriction   Ischiocavernosus moderate restriction   Deep Transverse Perineal moderate restriction   Compress Urethra/Sphincter moderate restriction   Urethrovaginalis not tested   Pubococcygeus moderate restriction   Iliococcygeus moderate restriction   Coccygeus not tested   Piriformis (palpated externally)  tenderness   Obturator Internus  moderate restriction   Pelvic Clock         Assessment      PT did PFM release    PT continued to cue pt on  proper breathing posture and inhale/exhale     PT continued with stretches and relaxation cued on being mindful    Goals (to be met in 10 visits)      Not Met Progress Toward Partially Met Met   Pt will be I with HEP,its progression and management of symptoms, biomechanical strategies and self correction of upright posturing and PFM facilitation to manage IAP with daily tasks [] [] [] []   Patient will verbalize improved  understanding of  bladder/bowel habits ,bladder ,bladder retraining  and long term management [] [] [] []   Patient will exhibit increased isolated pelvic floor strength  , with appropriate relaxation for improve pelvic brace /IAP management with ADLS  manage UI/MESERET and prevent leakage during ADLS and  have less dyspareunia [] [] [] []   Patient to report  urinary frequency to every at least 4 hours to decreased risk of retention [] [] [] []   Patient reports a reduction in MESERET 50% or better to  day to maintain skin integrity of vulva area. [] [] [] []   Pt will demonstrate biomechanical strategies for managing IAP for safe performance of  daily, recreational  and work tasks, 75% of the time to avoid incontinence issues,  [] [] [] []   Pt will demonstrate decreased muscular tone/tenderness on PFM to minimal to  none in order to safely progress with PFM strengthening and stabilization as appropriate and improve Marinoff grade [] [] [] []              Plan  assess PFM release    Treatment Last 4 Visits  Treatment Day: 2       6/4/2025 6/12/2025   Pelvic Treatment   Therapeutic Exercise  - Happy baby 30 secs x 2  - Piriformis 30 secs  x 2  - Child pose 30 x2  - Supine butterfly stretch 30 x2    Neuro Re-Education Diaphragmatic breathing x5 reps seated and internal    Diaphragmatic breathing with PFM coordination x 5:seated internal  Precontraction internal with cough/bridges  Tra facilitation x2 Diaphragmatic breathing x5 reps    Diaphragmatic breathing with PFM coordination x5     Intervaginal Neuro shan: long hold contractions x 3 secs then relax  Elevators motions   PFM with precontractions with  cough x3 reps     TRA facilitation x3 reps     Manual Therapy Informed consent for internal pelvic evaluation given: Yes      PFM :   MFR/internal   Layers 1-3 focused tender areas Informed consent for internal pelvic evaluation given: Yes      PFM :   MFR/internal   Layers 1-3 focused on  1-3   Therapeutic Activity PT provided/reviewed education on pelvic anatomy and function with diagrams and pelvic model, bladder normatives, adequate hydration levels, proper toileting posture, diaphragmatic breathing for PNS activation and pelvic floor relaxation , and knack/pelvic muscle brace   umbrella breathing  proper posturing  precontractions toileting posture      Therapeutic  Exercise Minutes  10   Neuro Re-Educ Minutes 15 10   Manual Therapy Minutes 10 25   Evaluation Minutes 30    Total Time Of Timed Procedures 25 45   Total Time Of Service-Based Procedures 30 0   Total Treatment Time 55 45        HEP       Charges  exx1,man mob x2 ,neuro shan x1

## 2025-06-25 ENCOUNTER — OFFICE VISIT (OUTPATIENT)
Dept: PHYSICAL THERAPY | Age: 30
End: 2025-06-25
Attending: STUDENT IN AN ORGANIZED HEALTH CARE EDUCATION/TRAINING PROGRAM
Payer: COMMERCIAL

## 2025-06-25 PROCEDURE — 97110 THERAPEUTIC EXERCISES: CPT

## 2025-06-25 PROCEDURE — 97112 NEUROMUSCULAR REEDUCATION: CPT

## 2025-06-25 PROCEDURE — 97140 MANUAL THERAPY 1/> REGIONS: CPT

## 2025-06-25 NOTE — PROGRESS NOTES
Patient: Amarilys Herrera (30 year old, female) Referring Provider:  Insurance:   Diagnosis: Dyspareunia in female (N94.10)  Pelvic pain (R10.2) Aby Johansenliboriosophie Pastori*  PATRICIAJONY   Date of Surgery: No data recorded Next MD visit:  N/A   Precautions:  None No data recorded Referral Information:    Date of Evaluation: Req: 0, Auth: 0, Exp:     06/04/25 POC Auth Visits:  10        Today's Date   6/25/2025    Subjective  Pt states that the execrises helps, easier to do. She still has pain on  the lower R quadrant when in prone doing the exercises. Pt states that bladder : she still states that at work she is not as consistent with the 2-4 hrs but easier at home, still paying to the urge.Pt states that she still has constipated despite hydration/diet.pt states that she goes daily but tends to strain. Pt states that she has not had a chance to to do sexual interourse.jumping jacks seems better       Pain:       Objective             Internal Palpation:  equal bilaterally    Superficial Transverse Perineal  moderate restriction   Bulbospongiosus moderate restriction   Ischiocavernosus moderate restriction   Deep Transverse Perineal moderate restriction   Compress Urethra/Sphincter moderate restriction   Urethrovaginalis not tested   Pubococcygeus moderate restriction   Iliococcygeus moderate restriction   Coccygeus not tested   Piriformis (palpated externally)  tenderness   Obturator Internus  moderate restriction   Pelvic Clock             Assessment    PT did PFM release   still with increased in tone with assessment    PT continued to cue pt on  proper breathing posture and inhale/exhale     PT reviewed HEP and cued on being mindful of gluteal activation, and contraction with exhalationals (to be met in 10 visits)      Not Met Progress Toward Partially Met Met   Pt will be I with HEP,its progression and management of symptoms, biomechanical strategies and self correction of upright posturing and PFM facilitation to manage  IAP with daily tasks [] [] [] []   Patient will verbalize improved  understanding of  bladder/bowel habits ,bladder ,bladder retraining  and long term management [] [] [] []   Patient will exhibit increased isolated pelvic floor strength  , with appropriate relaxation for improve pelvic brace /IAP management with ADLS  manage UI/MESERET and prevent leakage during ADLS and  have less dyspareunia [] [] [] []   Patient to report urinary frequency to every at least 4 hours to decreased risk of retention [] [] [] []   Patient reports a reduction in MESERET 50% or better to  day to maintain skin integrity of vulva area. [] [] [] []   Pt will demonstrate biomechanical strategies for managing IAP for safe performance of  daily, recreational  and work tasks, 75% of the time to avoid incontinence issues,  [] [] [] []   Pt will demonstrate decreased muscular tone/tenderness on PFM to minimal to  none in order to safely progress with PFM strengthening and stabilization as appropriate and improve Marinoff grade [] [] [] []                  Plan  assess PFM release    Treatment Last 4 Visits  Treatment Day: 3       6/4/2025 6/12/2025 6/25/2025   Pelvic Treatment   Therapeutic Exercise  - Happy baby 30 secs x 2  - Piriformis 30 secs  x 2  - Child pose 30 x2  - Supine butterfly stretch 30 x2  Happy baby 30 secs x3  Swiss ball squats 2x10  Prone hip extension x2 ways x10  SL reverse clams  x10   Neuro Re-Education Diaphragmatic breathing x5 reps seated and internal    Diaphragmatic breathing with PFM coordination x 5:seated internal  Precontraction internal with cough/bridges  Tra facilitation x2 Diaphragmatic breathing x5 reps    Diaphragmatic breathing with PFM coordination x5     Intervaginal Neuro shan: long hold contractions x 3 secs then relax  Elevators motions   PFM with precontractions with  cough x3 reps     TRA facilitation x3 responding  Standing cough precontractions x3   PFM quick contractions x10 secs  Ball throws to  trampoline  4# 2x10 Diaphragmatic breathing x5 reps    Diaphragmatic breathing with PFM coordination x5     Intervaginal Neuro shan: long hold contractions x 3 secs then relax  Ascending motions x4     PFM with precontractions with  bridges/ dead bugs/     Manual Therapy Informed consent for internal pelvic evaluation given: Yes      PFM :   MFR/internal   Layers 1-3 focused tender areas Informed consent for internal pelvic evaluation given: Yes      PFM :   MFR/internal   Layers 1-3 focused on  1-3 Informed consent for internal pelvic evaluation given: Yes      PFM :   MFR/internal   Layers 1-3 focused on 1-3        Therapeutic Activity PT provided/reviewed education on pelvic anatomy and function with diagrams and pelvic model, bladder normatives, adequate hydration levels, proper toileting posture, diaphragmatic breathing for PNS activation and pelvic floor relaxation , and knack/pelvic muscle brace   umbrella breathing  proper posturing  precontractions toileting posture       Therapeutic Exercise Minutes  10 10   Neuro Re-Educ Minutes 15 12 8   Manual Therapy Minutes 10 23 24   Evaluation Minutes 30     Total Time Of Timed Procedures 25 45 42   Total Time Of Service-Based Procedures 30 0 0   Total Treatment Time 55 45 42   HEP   reviewed        HEP  reviewed    Charges  man x2,neuro x1,theraexx1

## 2025-07-01 ENCOUNTER — APPOINTMENT (OUTPATIENT)
Dept: PHYSICAL THERAPY | Age: 30
End: 2025-07-01
Attending: STUDENT IN AN ORGANIZED HEALTH CARE EDUCATION/TRAINING PROGRAM
Payer: COMMERCIAL

## 2025-07-09 ENCOUNTER — OFFICE VISIT (OUTPATIENT)
Dept: PHYSICAL THERAPY | Age: 30
End: 2025-07-09
Attending: STUDENT IN AN ORGANIZED HEALTH CARE EDUCATION/TRAINING PROGRAM
Payer: COMMERCIAL

## 2025-07-09 PROCEDURE — 97140 MANUAL THERAPY 1/> REGIONS: CPT

## 2025-07-09 PROCEDURE — 97112 NEUROMUSCULAR REEDUCATION: CPT

## 2025-07-09 NOTE — PROGRESS NOTES
Patient: Amarilys Herrera (30 year old, female) Referring Provider:  Insurance:   Diagnosis: Dyspareunia in female (N94.10)  Pelvic pain (R10.2) Miguelsabra Ferguson*  NONA   Date of Surgery: No data recorded Next MD visit:  N/A   Precautions:  None No data recorded Referral Information:    Date of Evaluation: Req: 0, Auth: 0, Exp:     06/04/25 POC Auth Visits:  10        Today's Date   7/9/2025    Subjective  Pt states that she is getting better, less pressure and urge when she is doing exercises.used pelvic wand x1 time and had no issues       Pain: 0/10     Objective        Leaking improved by:  no issues unless the bladder is full  Stress incontinence:  Yes   Amount of leakage: minimal to none  Pad use: no  Pad Change frequency: 1x a day change of underwear    Urinary urges : does not feel urge and could go 6 hrs without urge then full  Urinary frequency decreased at work  Trouble emptying bladder: completely :No   Nocturia: No    Fluid Intake: could be better  Urinary flow normal    BOWEL HABITS    Types of symptoms:  normal  Frequency of bowel movements: 1 x a day  Stool consistency: [place] Stool Scale: 4  Do you strain with defecation: No   Laxative/Stool softener use: No  Prolapse sensation : NA    SEXUAL HEALTH STATUS : unknown as she had not had sexual relations       Assessment    Follow up as noted above continued with PFM and discussed umbrella breathing and thoracic mobility with exercises and unilateral DB for more decend and ascend on PF for dwn  training  Goals (to be met in 10 visits)      Not Met Progress Toward Partially Met Met   Pt will be I with HEP,its progression and management of symptoms, biomechanical strategies and self correction of upright posturing and PFM facilitation to manage IAP with daily tasks [] [] [] []   Patient will verbalize improved  understanding of  bladder/bowel habits ,bladder ,bladder retraining  and long term management [] [] [] []   Patient will exhibit  increased isolated pelvic floor strength  , with appropriate relaxation for improve pelvic brace /IAP management with ADLS  manage UI/MESERET and prevent leakage during ADLS and  have less dyspareunia [] [] [] []   Patient to report urinary frequency to every at least 4 hours to decreased risk of retention [] [] [] []   Patient reports a reduction in MESERET 50% or better to  day to maintain skin integrity of vulva area. [] [] [] []   Pt will demonstrate biomechanical strategies for managing IAP for safe performance of  daily, recreational  and work tasks, 75% of the time to avoid incontinence issues,  [] [] [] []   Pt will demonstrate decreased muscular tone/tenderness on PFM to minimal to  none in order to safely progress with PFM strengthening and stabilization as appropriate and improve Marinoff grade [] [] [] []                      Plan  assess PFM release    Treatment Last 4 Visits  Treatment Day: 4       6/4/2025 6/12/2025 6/25/2025 7/9/2025   Pelvic Treatment   Therapeutic Exercise  - Happy baby 30 secs x 2  - Piriformis 30 secs  x 2  - Child pose 30 x2  - Supine butterfly stretch 30 x2  Happy baby 30 secs x3  Swiss ball squats 2x10  Prone hip extension x2 ways x10  SL reverse clams  x10    Neuro Re-Education Diaphragmatic breathing x5 reps seated and internal    Diaphragmatic breathing with PFM coordination x 5:seated internal  Precontraction internal with cough/bridges  Tra facilitation x2 Diaphragmatic breathing x5 reps    Diaphragmatic breathing with PFM coordination x5     Intervaginal Neuro shan: long hold contractions x 3 secs then relax  Elevators motions   PFM with precontractions with  cough x3 reps     TRA facilitation x3 responding  Standing cough precontractions x3   PFM quick contractions x10 secs  Ball throws to trampoline  4# 2x10 Diaphragmatic breathing x5 reps    Diaphragmatic breathing with PFM coordination x5     Intervaginal Neuro shan: long hold contractions x 3 secs then relax  Ascending  motions x4     PFM with precontractions with  bridges/ dead bugs/   Diaphragmatic breathing x5 reps    Diaphragmatic breathing with PFM coordination x 5   Intervaginal Neuro shan: long hold contractions x 3 secs then relax  Ascending x 3-4 reps  Bearing down x 3-4 reps  IR/ER and SKTC movements  SL DB and in tea cup position for unilateral expansion   Open book x 3-5 reps with breathing     Manual Therapy Informed consent for internal pelvic evaluation given: Yes      PFM :   MFR/internal   Layers 1-3 focused tender areas Informed consent for internal pelvic evaluation given: Yes      PFM :   MFR/internal   Layers 1-3 focused on  1-3 Informed consent for internal pelvic evaluation given: Yes      PFM :   MFR/internal   Layers 1-3 focused on 1-3      Informed consent for internal pelvic evaluation given: Yes      PFM :   MFR/internal   Layers 1-3 focused on 1-3     Therapeutic Activity PT provided/reviewed education on pelvic anatomy and function with diagrams and pelvic model, bladder normatives, adequate hydration levels, proper toileting posture, diaphragmatic breathing for PNS activation and pelvic floor relaxation , and knack/pelvic muscle brace   umbrella breathing  proper posturing  precontractions toileting posture        Therapeutic Exercise Minutes  10 10    Neuro Re-Educ Minutes 15 12 8 15   Manual Therapy Minutes 10 23 24 25   Evaluation Minutes 30      Total Time Of Timed Procedures 25 45 42 40   Total Time Of Service-Based Procedures 30 0 0 0   Total Treatment Time 55 45 42 40   HEP   reviewed Discussed thoracic mobility        HEP  Discussed thoracic mobility    Charges  man mob x2,neuro shan x1

## 2025-07-16 ENCOUNTER — OFFICE VISIT (OUTPATIENT)
Dept: PHYSICAL THERAPY | Age: 30
End: 2025-07-16
Attending: STUDENT IN AN ORGANIZED HEALTH CARE EDUCATION/TRAINING PROGRAM
Payer: COMMERCIAL

## 2025-07-16 PROCEDURE — 97140 MANUAL THERAPY 1/> REGIONS: CPT

## 2025-07-16 PROCEDURE — 97112 NEUROMUSCULAR REEDUCATION: CPT

## 2025-07-16 NOTE — PROGRESS NOTES
Patient: Amarilys Herrera (30 year old, female) Referring Provider:  Insurance:   Diagnosis: Dyspareunia in female (N94.10)  Pelvic pain (R10.2) Aby Ferguson*  PATRICIAJONY   Date of Surgery: No data recorded Next MD visit:  N/A   Precautions:  None No data recorded Referral Information:    Date of Evaluation: Req: 0, Auth: 0, Exp:     06/04/25 POC Auth Visits:  10        Today's Date   7/16/2025    Subjective  Pt states that she feels like she still needs to coordinate her movements with PFM, sexual relations was still painful in a certain position: initial and deep penetration, pain lasted the next day.pt states that she has not tried doing the pelvic wand       Pain: 0/10     Objective          Superficial Transverse Perineal  Minimal restrictions   Bulbospongiosus Moderate restrictions   Ischiocavernosus Moderate restrictions   Deep Transverse Perineal Minimal restrictions   Compress Urethra/Sphincter Minimal restrictions   Urethrovaginalis not tested   Pubococcygeus Minimal restrictions   Iliococcygeus Minimal restrictions   Coccygeus not tested   Piriformis (palpated externally)  tenderness   Obturator Internus  moderate restriction    Assessment      PT continued with PFM release   as per findings,  pt demonstrated decreased tone on PF layers  PT continued to cue pt on  proper breathing posture and inhale/exhale        Goals (to be met in 10 visits)      Not Met Progress Toward Partially Met Met   Pt will be I with HEP,its progression and management of symptoms, biomechanical strategies and self correction of upright posturing and PFM facilitation to manage IAP with daily tasks [] [] [] []   Patient will verbalize improved  understanding of  bladder/bowel habits ,bladder ,bladder retraining  and long term management [] [] [] []   Patient will exhibit increased isolated pelvic floor strength  , with appropriate relaxation for improve pelvic brace /IAP management with ADLS  manage UI/MESERET and prevent leakage  during ADLS and  have less dyspareunia [] [] [] []   Patient to report urinary frequency to every at least 4 hours to decreased risk of retention [] [] [] []   Patient reports a reduction in MESERET 50% or better to  day to maintain skin integrity of vulva area. [] [] [] []   Pt will demonstrate biomechanical strategies for managing IAP for safe performance of  daily, recreational  and work tasks, 75% of the time to avoid incontinence issues,  [] [] [] []   Pt will demonstrate decreased muscular tone/tenderness on PFM to minimal to  none in order to safely progress with PFM strengthening and stabilization as appropriate and improve Marinoff grade [] [] [] []                          Plan  assess PFM release    Treatment Last 4 Visits  Treatment Day: 5 6/12/2025 6/25/2025 7/9/2025 7/16/2025   Pelvic Treatment   Therapeutic Exercise - Happy baby 30 secs x 2  - Piriformis 30 secs  x 2  - Child pose 30 x2  - Supine butterfly stretch 30 x2  Happy baby 30 secs x3  Swiss ball squats 2x10  Prone hip extension x2 ways x10  SL reverse clams  x10     Neuro Re-Education Diaphragmatic breathing x5 reps    Diaphragmatic breathing with PFM coordination x5     Intervaginal Neuro shan: long hold contractions x 3 secs then relax  Elevators motions   PFM with precontractions with  cough x3 reps     TRA facilitation x3 responding  Standing cough precontractions x3   PFM quick contractions x10 secs  Ball throws to trampoline  4# 2x10 Diaphragmatic breathing x5 reps    Diaphragmatic breathing with PFM coordination x5     Intervaginal Neuro shan: long hold contractions x 3 secs then relax  Ascending motions x4     PFM with precontractions with  bridges/ dead bugs/   Diaphragmatic breathing x5 reps    Diaphragmatic breathing with PFM coordination x 5   Intervaginal Neuro shan: long hold contractions x 3 secs then relax  Ascending x 3-4 reps  Bearing down x 3-4 reps  IR/ER and SKTC movements  SL DB and in tea cup position for unilateral  expansion   Open book x 3-5 reps with breathing   Diaphragmatic breathing x5 reps  Diaphragmatic breathing with PFM coordination x5   Intervaginal Neuro shan: long hold contractions x 3 secs then relax  PFM long hold x 5 secs x 3     PERF 3+/8/3/8   TRA facilitation x3 reps     Manual Therapy Informed consent for internal pelvic evaluation given: Yes      PFM :   MFR/internal   Layers 1-3 focused on  1-3 Informed consent for internal pelvic evaluation given: Yes      PFM :   MFR/internal   Layers 1-3 focused on 1-3      Informed consent for internal pelvic evaluation given: Yes      PFM :   MFR/internal   Layers 1-3 focused on 1-3   Informed consent for internal pelvic evaluation given: Yes      PFM :   MFR/internal   Layers 1-3 focused on 1-3 L and R   Therapeutic Exercise Minutes 10 10     Neuro Re-Educ Minutes 12 8 15 15   Manual Therapy Minutes 23 24 25 30   Total Time Of Timed Procedures 45 42 40 45   Total Time Of Service-Based Procedures 0 0 0 0   Total Treatment Time 45 42 40 45   HEP  reviewed Discussed thoracic mobility         HEP  Discussed thoracic mobility    Charges  man mob x 2, neuro shan x 1

## 2025-07-23 ENCOUNTER — OFFICE VISIT (OUTPATIENT)
Dept: PHYSICAL THERAPY | Age: 30
End: 2025-07-23
Attending: STUDENT IN AN ORGANIZED HEALTH CARE EDUCATION/TRAINING PROGRAM
Payer: COMMERCIAL

## 2025-07-23 PROCEDURE — 97112 NEUROMUSCULAR REEDUCATION: CPT

## 2025-07-23 PROCEDURE — 97140 MANUAL THERAPY 1/> REGIONS: CPT

## 2025-07-23 NOTE — PROGRESS NOTES
Patient: Amarilys Herrera (30 year old, female) Referring Provider:  Insurance:   Diagnosis: Dyspareunia in female (N94.10)  Pelvic pain (R10.2) Aby Johansenliboriosophie Ferguson*  PATRICIAJNOY   Date of Surgery: No data recorded Next MD visit:  N/A   Precautions:  None No data recorded Referral Information:    Date of Evaluation: Req: 0, Auth: 0, Exp:     06/04/25 POC Auth Visits:  10        Today's Date   7/23/2025    Subjective  pt states that she has been constipated for2 days, dehydration was bad. She relates that she has not been able to do her HEP as she worked 4 days. no sexual relations at this time       Pain: 0/10     Objective             Superficial Transverse Perineal  Minimal restrictions   Bulbospongiosus Minimal restrictions   Ischiocavernosus Minimal restrictions   Deep Transverse Perineal Minimal restrictions   Compress Urethra/Sphincter Minimal restrictions   Urethrovaginalis not tested   Pubococcygeus Minimal restrictions   Iliococcygeus Minimal restrictions   Coccygeus not tested   Piriformis (palpated externally)  WFL   Obturator Internus  Minimal restrictions        Assessment     PT continued to work on PFM release and proprioception exercises. Less restrcitions noted and able to do PFM/relaxation ascending and descending with better  coordination but worked on proprioception regarding positioning during internal. She did much better with eccentric control    Goals (to be met in 10 visits)      Not Met Progress Toward Partially Met Met   Pt will be I with HEP,its progression and management of symptoms, biomechanical strategies and self correction of upright posturing and PFM facilitation to manage IAP with daily tasks [] [] [] []   Patient will verbalize improved  understanding of  bladder/bowel habits ,bladder ,bladder retraining  and long term management [] [] [] []   Patient will exhibit increased isolated pelvic floor strength  , with appropriate relaxation for improve pelvic brace /IAP management with  ADLS  manage UI/MESERET and prevent leakage during ADLS and  have less dyspareunia [] [] [] []   Patient to report urinary frequency to every at least 4 hours to decreased risk of retention [] [] [] []   Patient reports a reduction in MESERET 50% or better to  day to maintain skin integrity of vulva area. [] [] [] []   Pt will demonstrate biomechanical strategies for managing IAP for safe performance of  daily, recreational  and work tasks, 75% of the time to avoid incontinence issues,  [] [] [] []   Pt will demonstrate decreased muscular tone/tenderness on PFM to minimal to  none in order to safely progress with PFM strengthening and stabilization as appropriate and improve Marinoff grade [] [] [] []                              Plan  assess PFM release    Treatment Last 4 Visits  Treatment Day: 6 6/25/2025 7/9/2025 7/16/2025 7/23/2025   Pelvic Treatment   Therapeutic Exercise Happy baby 30 secs x3  Swiss ball squats 2x10  Prone hip extension x2 ways x10  SL reverse clams  x10      Neuro Re-Education Diaphragmatic breathing x5 reps    Diaphragmatic breathing with PFM coordination x5     Intervaginal Neuro shan: long hold contractions x 3 secs then relax  Ascending motions x4     PFM with precontractions with  bridges/ dead bugs/   Diaphragmatic breathing x5 reps    Diaphragmatic breathing with PFM coordination x 5   Intervaginal Neuro shan: long hold contractions x 3 secs then relax  Ascending x 3-4 reps  Bearing down x 3-4 reps  IR/ER and SKTC movements  SL DB and in tea cup position for unilateral expansion   Open book x 3-5 reps with breathing   Diaphragmatic breathing x5 reps  Diaphragmatic breathing with PFM coordination x5   Intervaginal Neuro shan: long hold contractions x 3 secs then relax  PFM long hold x 5 secs x 3     PERF 3+/8/3/8   TRA facilitation x3 reps   INTERNAL:  Diaphragmatic breathing x 3 reps  Diaphragmatic breathing with PFM coordination x3 :  PFM  internal proprioception with SKTC and  DB  Internal proprioception on location of palpation  Intervaginal Neuro shan: long hold contractions x 3 secs then relax x  reps  Ascending motions: elevators x3 cycles then relaxation   Descending motions elevations x cycles   PFM quick contraction x 10 secs   Manual Therapy Informed consent for internal pelvic evaluation given: Yes      PFM :   MFR/internal   Layers 1-3 focused on 1-3      Informed consent for internal pelvic evaluation given: Yes      PFM :   MFR/internal   Layers 1-3 focused on 1-3   Informed consent for internal pelvic evaluation given: Yes      PFM :   MFR/internal   Layers 1-3 focused on 1-3 L and R Informed consent for internal pelvic evaluation given: Yes      PFM :   MFR/internal   Layers 1-3 focused on 1-3        Therapeutic Exercise Minutes 10      Neuro Re-Educ Minutes 8 15 15 15   Manual Therapy Minutes 24 25 30 25   Total Time Of Timed Procedures 42 40 45 40   Total Time Of Service-Based Procedures 0 0 0 0   Total Treatment Time 42 40 45 40   HEP reviewed Discussed thoracic mobility          HEP  reviewed importance of checking throughout the day and stretches      Charges  man mob x2, neuro rred x1

## 2025-07-25 ENCOUNTER — NURSE ONLY (OUTPATIENT)
Dept: INTERNAL MEDICINE CLINIC | Facility: HOSPITAL | Age: 30
End: 2025-07-25
Attending: PREVENTIVE MEDICINE

## 2025-07-25 DIAGNOSIS — Z00.00 WELLNESS EXAMINATION: Primary | ICD-10-CM

## 2025-07-25 PROCEDURE — 86480 TB TEST CELL IMMUN MEASURE: CPT

## 2025-07-29 LAB
M TB IFN-G CD4+ T-CELLS BLD-ACNC: 0.04 IU/ML
M TB TUBERC IFN-G BLD QL: NEGATIVE
M TB TUBERC IGNF/MITOGEN IGNF CONTROL: >10 IU/ML
QFT TB1 AG MINUS NIL: 0 IU/ML
QFT TB2 AG MINUS NIL: -0.02 IU/ML

## 2025-07-30 ENCOUNTER — APPOINTMENT (OUTPATIENT)
Dept: PHYSICAL THERAPY | Age: 30
End: 2025-07-30
Attending: STUDENT IN AN ORGANIZED HEALTH CARE EDUCATION/TRAINING PROGRAM
Payer: COMMERCIAL

## 2025-08-06 ENCOUNTER — APPOINTMENT (OUTPATIENT)
Dept: PHYSICAL THERAPY | Age: 30
End: 2025-08-06
Attending: STUDENT IN AN ORGANIZED HEALTH CARE EDUCATION/TRAINING PROGRAM

## 2025-08-07 ENCOUNTER — OFFICE VISIT (OUTPATIENT)
Dept: PHYSICAL THERAPY | Age: 30
End: 2025-08-07
Attending: STUDENT IN AN ORGANIZED HEALTH CARE EDUCATION/TRAINING PROGRAM

## 2025-08-07 PROCEDURE — 97140 MANUAL THERAPY 1/> REGIONS: CPT

## 2025-08-07 PROCEDURE — 97112 NEUROMUSCULAR REEDUCATION: CPT

## 2025-08-13 ENCOUNTER — APPOINTMENT (OUTPATIENT)
Dept: PHYSICAL THERAPY | Age: 30
End: 2025-08-13
Attending: STUDENT IN AN ORGANIZED HEALTH CARE EDUCATION/TRAINING PROGRAM